# Patient Record
Sex: MALE | Race: WHITE
[De-identification: names, ages, dates, MRNs, and addresses within clinical notes are randomized per-mention and may not be internally consistent; named-entity substitution may affect disease eponyms.]

---

## 2017-02-07 ENCOUNTER — HOSPITAL ENCOUNTER (OUTPATIENT)
Dept: HOSPITAL 53 - M SFHCPLAZ | Age: 52
Discharge: HOME | End: 2017-02-07
Attending: INTERNAL MEDICINE
Payer: MEDICARE

## 2017-02-07 DIAGNOSIS — E83.52: ICD-10-CM

## 2017-02-07 DIAGNOSIS — Z23: ICD-10-CM

## 2017-02-07 DIAGNOSIS — E78.2: ICD-10-CM

## 2017-02-07 DIAGNOSIS — Z12.5: ICD-10-CM

## 2017-02-07 DIAGNOSIS — B20: Primary | ICD-10-CM

## 2017-02-07 LAB
ALBUMIN SERPL BCG-MCNC: 4 GM/DL (ref 3.2–5.2)
ALBUMIN/GLOB SERPL: 1.38 {RATIO} (ref 1–1.93)
ALP SERPL-CCNC: 116 U/L (ref 45–117)
ALT SERPL W P-5'-P-CCNC: 19 U/L (ref 12–78)
ANION GAP SERPL CALC-SCNC: 8 MEQ/L (ref 8–16)
AST SERPL-CCNC: 16 U/L (ref 15–37)
BILIRUB SERPL-MCNC: 0.2 MG/DL (ref 0.2–1)
BUN SERPL-MCNC: 5 MG/DL (ref 7–18)
CALCIUM SERPL-MCNC: 11 MG/DL (ref 8.5–10.1)
CHLORIDE SERPL-SCNC: 105 MEQ/L (ref 98–107)
CHOLEST SERPL-MCNC: 192 MG/DL (ref ?–200)
CO2 SERPL-SCNC: 30 MEQ/L (ref 21–32)
CREAT SERPL-MCNC: 1.09 MG/DL (ref 0.7–1.3)
GFR SERPL CREATININE-BSD FRML MDRD: > 60 ML/MIN/{1.73_M2} (ref 56–?)
GLUCOSE SERPL-MCNC: 102 MG/DL (ref 70–105)
POTASSIUM SERPL-SCNC: 4.5 MEQ/L (ref 3.5–5.1)
PROT SERPL-MCNC: 6.9 GM/DL (ref 6.4–8.2)
SODIUM SERPL-SCNC: 143 MEQ/L (ref 136–145)
TRIGL SERPL-MCNC: 223 MG/DL (ref ?–150)

## 2017-02-07 PROCEDURE — 86360 T CELL ABSOLUTE COUNT/RATIO: CPT

## 2017-02-07 PROCEDURE — 80053 COMPREHEN METABOLIC PANEL: CPT

## 2017-02-07 PROCEDURE — 87536 HIV-1 QUANT&REVRSE TRNSCRPJ: CPT

## 2017-02-07 PROCEDURE — 83970 ASSAY OF PARATHORMONE: CPT

## 2017-02-07 PROCEDURE — 90686 IIV4 VACC NO PRSV 0.5 ML IM: CPT

## 2017-02-07 PROCEDURE — 36415 COLL VENOUS BLD VENIPUNCTURE: CPT

## 2017-02-07 PROCEDURE — 86780 TREPONEMA PALLIDUM: CPT

## 2017-02-07 PROCEDURE — 80061 LIPID PANEL: CPT

## 2017-02-09 LAB
%CD3+CD4+CD8-: 44.3 %
%CD3+CD4-CD8-: 0.9 %
ACANTHOCYTES BLD QL SMEAR: 2.3 %
ACANTHOCYTES BLD QL SMEAR: 25.2 %
CD4/CD8 NYSDOH RATIO: 1.76
HCT VFR BLD AUTO: 42 % (ref 37.5–51)
HGB BLD-MCNC: 14.8 G/DL (ref 12.6–17.7)
MORPHOLOGY BLD-IMP: (no result)
PELGER HUET CELLS BLD QL SMEAR: 29 /UL
PELGER HUET CELLS BLD QL SMEAR: 74 /UL
SMUDGE CELLS BLD QL SMEAR: 1418 /UL
SMUDGE CELLS BLD QL SMEAR: 806 /UL
WBC # BLD AUTO: 10.5 X10E3/UL (ref 3.4–10.8)

## 2017-07-13 ENCOUNTER — HOSPITAL ENCOUNTER (OUTPATIENT)
Dept: HOSPITAL 53 - M SFHCPLAZ | Age: 52
End: 2017-07-13
Attending: INTERNAL MEDICINE
Payer: MEDICARE

## 2017-07-13 DIAGNOSIS — Z11.3: ICD-10-CM

## 2017-07-13 DIAGNOSIS — F17.210: ICD-10-CM

## 2017-07-13 DIAGNOSIS — Z72.53: ICD-10-CM

## 2017-07-13 DIAGNOSIS — Z88.8: ICD-10-CM

## 2017-07-13 DIAGNOSIS — A09: ICD-10-CM

## 2017-07-13 DIAGNOSIS — B20: Primary | ICD-10-CM

## 2017-07-13 LAB
ALBUMIN SERPL BCG-MCNC: 3.9 GM/DL (ref 3.2–5.2)
ALBUMIN/GLOB SERPL: 1.3 {RATIO} (ref 1–1.93)
ALP SERPL-CCNC: 122 U/L (ref 45–117)
ALT SERPL W P-5'-P-CCNC: 17 U/L (ref 12–78)
ANION GAP SERPL CALC-SCNC: 5 MEQ/L (ref 8–16)
AST SERPL-CCNC: 10 U/L (ref 15–37)
BILIRUB SERPL-MCNC: 0.3 MG/DL (ref 0.2–1)
BUN SERPL-MCNC: 6 MG/DL (ref 7–18)
CALCIUM SERPL-MCNC: 10.5 MG/DL (ref 8.5–10.1)
CHLORIDE SERPL-SCNC: 108 MEQ/L (ref 98–107)
CO2 SERPL-SCNC: 27 MEQ/L (ref 21–32)
CREAT SERPL-MCNC: 1.05 MG/DL (ref 0.7–1.3)
GFR SERPL CREATININE-BSD FRML MDRD: > 60 ML/MIN/{1.73_M2} (ref 56–?)
GLUCOSE SERPL-MCNC: 103 MG/DL (ref 70–105)
POTASSIUM SERPL-SCNC: 4.3 MEQ/L (ref 3.5–5.1)
PROT SERPL-MCNC: 6.9 GM/DL (ref 6.4–8.2)
SODIUM SERPL-SCNC: 140 MEQ/L (ref 136–145)

## 2017-07-13 PROCEDURE — 80053 COMPREHEN METABOLIC PANEL: CPT

## 2017-07-13 PROCEDURE — 87491 CHLMYD TRACH DNA AMP PROBE: CPT

## 2017-07-13 PROCEDURE — 93005 ELECTROCARDIOGRAM TRACING: CPT

## 2017-07-13 PROCEDURE — 88108 CYTOPATH CONCENTRATE TECH: CPT

## 2017-07-13 PROCEDURE — 36415 COLL VENOUS BLD VENIPUNCTURE: CPT

## 2017-07-13 PROCEDURE — 86360 T CELL ABSOLUTE COUNT/RATIO: CPT

## 2017-07-13 PROCEDURE — 86780 TREPONEMA PALLIDUM: CPT

## 2017-07-13 PROCEDURE — 87536 HIV-1 QUANT&REVRSE TRNSCRPJ: CPT

## 2017-07-13 PROCEDURE — 87591 N.GONORRHOEAE DNA AMP PROB: CPT

## 2017-07-17 LAB
%CD3+CD4+CD8-: 47.2 %
%CD3+CD4-CD8-: 1.2 %
ACANTHOCYTES BLD QL SMEAR: 2.7 %
ACANTHOCYTES BLD QL SMEAR: 25.9 %
CD4/CD8 NYSDOH RATIO: 1.82
HCT VFR BLD AUTO: 41.9 % (ref 37.5–51)
HGB BLD-MCNC: 14.1 G/DL (ref 12.6–17.7)
MORPHOLOGY BLD-IMP: (no result)
PELGER HUET CELLS BLD QL SMEAR: 37 /UL
PELGER HUET CELLS BLD QL SMEAR: 84 /UL
SMUDGE CELLS BLD QL SMEAR: 1463 /UL
SMUDGE CELLS BLD QL SMEAR: 803 /UL
WBC # BLD AUTO: 8.7 X10E3/UL (ref 3.4–10.8)

## 2018-01-02 ENCOUNTER — HOSPITAL ENCOUNTER (OUTPATIENT)
Dept: HOSPITAL 53 - M SFHCPLAZ | Age: 53
End: 2018-01-02
Attending: INTERNAL MEDICINE
Payer: MEDICARE

## 2018-01-02 DIAGNOSIS — B20: Primary | ICD-10-CM

## 2018-01-02 DIAGNOSIS — Z23: ICD-10-CM

## 2018-01-02 DIAGNOSIS — I10: ICD-10-CM

## 2018-01-02 DIAGNOSIS — E78.2: ICD-10-CM

## 2018-01-02 LAB
ALBUMIN/GLOBULIN RATIO: 1.39 (ref 1–1.93)
ALBUMIN: 4.3 GM/DL (ref 3.2–5.2)
ALKALINE PHOSPHATASE: 131 U/L (ref 45–117)
ALT SERPL W P-5'-P-CCNC: 24 U/L (ref 12–78)
ANION GAP: 7 MEQ/L (ref 8–16)
APPEARANCE, URINE: CLEAR
AST SERPL-CCNC: 19 U/L (ref 7–37)
BACTERIA UR QL AUTO: NEGATIVE
BILIRUBIN, URINE AUTO: NEGATIVE
BILIRUBIN,TOTAL: 0.6 MG/DL (ref 0.2–1)
BLOOD UREA NITROGEN: 8 MG/DL (ref 7–18)
BLOOD, URINE BLOOD: NEGATIVE
CALCIUM LEVEL: 9.9 MG/DL (ref 8.5–10.1)
CARBON DIOXIDE LEVEL: 26 MEQ/L (ref 21–32)
CHLAMYDIA DNA AMPLIFICATION: NEGATIVE
CHLORIDE LEVEL: 105 MEQ/L (ref 98–107)
CHOLEST SERPL-MCNC: 199 MG/DL (ref ?–200)
CHOLESTEROL RISK RATIO: 7.37 (ref ?–5)
CREATININE FOR GFR: 1.08 MG/DL (ref 0.7–1.3)
GC DNA AMPLIFICATION: NEGATIVE
GFR SERPL CREATININE-BSD FRML MDRD: > 60 ML/MIN/{1.73_M2} (ref 56–?)
GLUCOSE, FASTING: 117 MG/DL (ref 70–105)
GLUCOSE, URINE (UA) AUTO: NEGATIVE MG/DL
HDLC SERPL-MCNC: 27 MG/DL (ref 40–?)
KETONE, URINE AUTO: NEGATIVE MG/DL
LDL CHOLESTEROL: 116.6 MG/DL (ref ?–100)
LEUKOCYTE ESTERASE UR QL STRIP.AUTO: NEGATIVE
NITRITE, URINE AUTO: NEGATIVE
NONHDLC SERPL-MCNC: 172 MG/DL
PH,URINE: 6 UNITS (ref 5–9)
POTASSIUM SERUM: 4.4 MEQ/L (ref 3.5–5.1)
PROT UR QL STRIP.AUTO: NEGATIVE MG/DL
RBC, URINE AUTO: 5 /HPF (ref 0–3)
SODIUM LEVEL: 138 MEQ/L (ref 136–145)
SPECIFIC GRAVITY URINE AUTO: 1.01 (ref 1–1.03)
SQUAMOUS #/AREA URNS AUTO: 0 /HPF (ref 0–6)
THYROID STIMULATING HORMONE: 0.76 UIU/ML (ref 0.36–3.74)
TOTAL PROTEIN: 7.4 GM/DL (ref 6.4–8.2)
TRIGLYCERIDES LEVEL: 277 MG/DL (ref ?–150)
UROBILINOGEN, URINE AUTO: 0.2 MG/DL (ref 0–2)
WBC, URINE AUTO: 1 /HPF (ref 0–3)

## 2018-01-02 PROCEDURE — 84443 ASSAY THYROID STIM HORMONE: CPT

## 2018-01-06 LAB
BASOPHILS # BLD: 0.1 X10E3/UL (ref 0–0.2)
CD3+CD4+ CELLS # BLD: 1500 /UL (ref 359–1519)
CD3+CD4+ CELLS NFR BLD: 48.4 % (ref 30.8–58.5)
CD3+CD4+ CELLS/CD3+CD8+ CLL BLD: 1.64 % (ref 0.92–3.72)
CD3+CD8+ CELLS # BLD: 918 /UL (ref 109–897)
CD3+CD8+ CELLS NFR BLD: 29.6 % (ref 12–35.5)
EOSINOPHIL # BLD: 0.3 X10E3/UL (ref 0–0.4)
ERYTHROCYTE [DISTWIDTH] IN BLOOD BY AUTOMATED COUNT: 14.4 % (ref 12.3–15.4)
HCT VFR BLD AUTO: 44.5 % (ref 37.5–51)
HGB BLD-MCNC: 14.6 G/DL (ref 13–17.7)
HIV1 RNA # SERPL NAA+PROBE: <20 COPIES/ML
HIV1 RNA SERPL NAA+PROBE-LOG#: (no result) {LOG_COPIES}/ML
LYMPHOCYTES # BLD AUTO: 3.1 X10E3/UL (ref 0.7–3.1)
MCH RBC QN AUTO: 30.9 PG (ref 26.6–33)
MCHC RBC AUTO-ENTMCNC: 32.8 G/DL (ref 31.5–35.7)
MCV RBC: 94 FL (ref 79–97)
MONOCYTES # BLD: 0.8 X10E3/UL (ref 0.1–0.9)
MORPHOLOGY BLD-IMP: (no result)
NEUTROPHILS # BLD AUTO: 7.3 X10E3/UL (ref 1.4–7)
NRBC: (no result)
RBC # BLD AUTO: 4.72 X10E6/UL (ref 4.14–5.8)
TB TEST (QFT) ANTIGEN MINUS NI: <0.01 IU/ML
TB TEST (QFT) ANTIGEN: 0.03 IU/ML
TB TEST (QFT) MITOGEN: 9.52 IU/ML
TB TEST (QFT) NIL: 0.05 IU/ML
WBC # BLD AUTO: 11.6 X10E3/UL (ref 3.4–10.8)

## 2018-04-02 ENCOUNTER — HOSPITAL ENCOUNTER (OUTPATIENT)
Dept: HOSPITAL 53 - M SFHCLERA | Age: 53
End: 2018-04-02
Attending: DERMATOLOGY
Payer: MEDICARE

## 2018-04-02 DIAGNOSIS — L72.0: Primary | ICD-10-CM

## 2018-04-02 PROCEDURE — 88305 TISSUE EXAM BY PATHOLOGIST: CPT

## 2018-08-13 ENCOUNTER — HOSPITAL ENCOUNTER (OUTPATIENT)
Dept: HOSPITAL 53 - M SMT | Age: 53
End: 2018-08-13
Attending: NURSE PRACTITIONER
Payer: MEDICARE

## 2018-08-13 ENCOUNTER — HOSPITAL ENCOUNTER (OUTPATIENT)
Dept: HOSPITAL 53 - M SFHCPLAZ | Age: 53
End: 2018-08-13
Attending: INTERNAL MEDICINE
Payer: MEDICARE

## 2018-08-13 DIAGNOSIS — N50.819: Primary | ICD-10-CM

## 2018-08-13 DIAGNOSIS — B20: Primary | ICD-10-CM

## 2018-08-13 DIAGNOSIS — B20: ICD-10-CM

## 2018-08-13 LAB
ALBUMIN/GLOBULIN RATIO: 1.06 (ref 1–1.93)
ALBUMIN: 3.6 GM/DL (ref 3.2–5.2)
ALKALINE PHOSPHATASE: 140 U/L (ref 45–117)
ALT SERPL W P-5'-P-CCNC: 21 U/L (ref 12–78)
ANION GAP: 12 MEQ/L (ref 8–16)
APPEARANCE, URINE: CLEAR
AST SERPL-CCNC: 17 U/L (ref 7–37)
BACTERIA UR QL AUTO: NEGATIVE
BILIRUBIN, URINE AUTO: NEGATIVE
BILIRUBIN,TOTAL: 0.3 MG/DL (ref 0.2–1)
BLOOD UREA NITROGEN: 6 MG/DL (ref 7–18)
BLOOD, URINE BLOOD: NEGATIVE
CALCIUM LEVEL: 10.2 MG/DL (ref 8.5–10.1)
CARBON DIOXIDE LEVEL: 21 MEQ/L (ref 21–32)
CHLAMYDIA DNA AMPLIFICATION: NEGATIVE
CHLORIDE LEVEL: 110 MEQ/L (ref 98–107)
CREATININE FOR GFR: 1.18 MG/DL (ref 0.7–1.3)
GC DNA AMPLIFICATION: NEGATIVE
GFR SERPL CREATININE-BSD FRML MDRD: > 60 ML/MIN/{1.73_M2} (ref 56–?)
GLUCOSE, FASTING: 140 MG/DL (ref 70–100)
GLUCOSE, URINE (UA) AUTO: NEGATIVE MG/DL
KETONE, URINE AUTO: NEGATIVE MG/DL
LEUKOCYTE ESTERASE UR QL STRIP.AUTO: NEGATIVE
NITRITE, URINE AUTO: NEGATIVE
PH,URINE: 8 UNITS (ref 5–9)
POTASSIUM SERUM: 4.3 MEQ/L (ref 3.5–5.1)
PROT UR QL STRIP.AUTO: NEGATIVE MG/DL
RBC, URINE AUTO: 0 /HPF (ref 0–3)
SODIUM LEVEL: 143 MEQ/L (ref 136–145)
SPECIFIC GRAVITY URINE AUTO: 1 (ref 1–1.03)
SQUAMOUS #/AREA URNS AUTO: 0 /HPF (ref 0–6)
TOTAL PROTEIN: 7 GM/DL (ref 6.4–8.2)
UROBILINOGEN, URINE AUTO: 0.2 MG/DL (ref 0–2)
WBC, URINE AUTO: 0 /HPF (ref 0–3)

## 2018-08-13 PROCEDURE — 81001 URINALYSIS AUTO W/SCOPE: CPT

## 2018-08-13 PROCEDURE — 80053 COMPREHEN METABOLIC PANEL: CPT

## 2018-10-08 ENCOUNTER — HOSPITAL ENCOUNTER (OUTPATIENT)
Dept: HOSPITAL 53 - M RAD | Age: 53
End: 2018-10-08
Attending: UROLOGY
Payer: MEDICARE

## 2018-10-08 DIAGNOSIS — N50.89: ICD-10-CM

## 2018-10-08 DIAGNOSIS — N50.3: Primary | ICD-10-CM

## 2018-10-08 PROCEDURE — 76870 US EXAM SCROTUM: CPT

## 2018-11-06 ENCOUNTER — HOSPITAL ENCOUNTER (OUTPATIENT)
Dept: HOSPITAL 53 - M LABSMT | Age: 53
End: 2018-11-06
Attending: UROLOGY
Payer: MEDICARE

## 2018-11-06 DIAGNOSIS — Z23: ICD-10-CM

## 2018-11-06 DIAGNOSIS — N50.3: ICD-10-CM

## 2018-11-06 DIAGNOSIS — Z01.818: Primary | ICD-10-CM

## 2018-11-06 DIAGNOSIS — B20: ICD-10-CM

## 2018-11-06 LAB
ANION GAP: 9 MEQ/L (ref 8–16)
BLOOD UREA NITROGEN: 6 MG/DL (ref 7–18)
CALCIUM LEVEL: 10.1 MG/DL (ref 8.5–10.1)
CARBON DIOXIDE LEVEL: 21 MEQ/L (ref 21–32)
CHLORIDE LEVEL: 109 MEQ/L (ref 98–107)
CREATININE FOR GFR: 1.16 MG/DL (ref 0.7–1.3)
GFR SERPL CREATININE-BSD FRML MDRD: > 60 ML/MIN/{1.73_M2} (ref 56–?)
GLUCOSE, FASTING: 109 MG/DL (ref 70–100)
HEMATOCRIT: 45.2 % (ref 42–52)
HEMOGLOBIN: 15.3 G/DL (ref 13.5–17.5)
INR: 1.04
MEAN CORPUSCULAR HEMOGLOBIN: 31.2 PG (ref 27–33)
MEAN CORPUSCULAR HGB CONC: 33.8 G/DL (ref 32–36.5)
MEAN CORPUSCULAR VOLUME: 92.2 FL (ref 80–96)
NRBC BLD AUTO-RTO: 0 % (ref 0–0)
PARTIAL THROMBOPLASTIN TIME: 31.6 SECONDS (ref 25.4–37.6)
PLATELET COUNT, AUTOMATED: 245 10^3/UL (ref 150–450)
POTASSIUM SERUM: 4.5 MEQ/L (ref 3.5–5.1)
PROTHROMBIN TIME: 13.7 SECONDS (ref 12.1–14.4)
RED BLOOD COUNT: 4.9 10^6/UL (ref 4.3–6.1)
RED CELL DISTRIBUTION WIDTH: 13.1 % (ref 11.5–14.5)
SODIUM LEVEL: 139 MEQ/L (ref 136–145)
WHITE BLOOD COUNT: 10.1 10^3/UL (ref 4–10)

## 2018-11-06 PROCEDURE — 80048 BASIC METABOLIC PNL TOTAL CA: CPT

## 2018-11-20 ENCOUNTER — HOSPITAL ENCOUNTER (OUTPATIENT)
Dept: HOSPITAL 53 - M SDC | Age: 53
Discharge: HOME | End: 2018-11-20
Attending: UROLOGY
Payer: MEDICARE

## 2018-11-20 DIAGNOSIS — E78.00: ICD-10-CM

## 2018-11-20 DIAGNOSIS — J84.112: ICD-10-CM

## 2018-11-20 DIAGNOSIS — Z79.899: ICD-10-CM

## 2018-11-20 DIAGNOSIS — M50.10: ICD-10-CM

## 2018-11-20 DIAGNOSIS — M12.9: ICD-10-CM

## 2018-11-20 DIAGNOSIS — G47.33: ICD-10-CM

## 2018-11-20 DIAGNOSIS — F43.10: ICD-10-CM

## 2018-11-20 DIAGNOSIS — L40.9: ICD-10-CM

## 2018-11-20 DIAGNOSIS — K21.9: ICD-10-CM

## 2018-11-20 DIAGNOSIS — J44.9: ICD-10-CM

## 2018-11-20 DIAGNOSIS — F32.9: ICD-10-CM

## 2018-11-20 DIAGNOSIS — K59.01: ICD-10-CM

## 2018-11-20 DIAGNOSIS — N50.89: ICD-10-CM

## 2018-11-20 DIAGNOSIS — B20: ICD-10-CM

## 2018-11-20 DIAGNOSIS — M79.7: ICD-10-CM

## 2018-11-20 DIAGNOSIS — Z72.0: ICD-10-CM

## 2018-11-20 DIAGNOSIS — F41.9: ICD-10-CM

## 2018-11-20 DIAGNOSIS — E83.52: ICD-10-CM

## 2018-11-20 DIAGNOSIS — N50.3: Primary | ICD-10-CM

## 2018-11-20 DIAGNOSIS — I10: ICD-10-CM

## 2018-11-20 DIAGNOSIS — G47.00: ICD-10-CM

## 2018-11-20 DIAGNOSIS — M81.0: ICD-10-CM

## 2018-11-20 DIAGNOSIS — Z85.828: ICD-10-CM

## 2018-11-20 PROCEDURE — 54860 REMOVAL OF EPIDIDYMIS: CPT

## 2018-11-20 RX ADMIN — BUPIVACAINE HYDROCHLORIDE 1 ML: 2.5 INJECTION, SOLUTION EPIDURAL; INFILTRATION; INTRACAUDAL at 16:23

## 2018-11-20 RX ADMIN — LIDOCAINE HYDROCHLORIDE 1 ML: 10 INJECTION, SOLUTION EPIDURAL; INFILTRATION; INTRACAUDAL; PERINEURAL at 16:23

## 2018-11-20 RX ADMIN — BACITRACIN 1 DOSE: 500 OINTMENT TOPICAL at 17:19

## 2018-11-20 RX ADMIN — SODIUM CHLORIDE, POTASSIUM CHLORIDE, SODIUM LACTATE AND CALCIUM CHLORIDE 1 MLS/HR: 600; 310; 30; 20 INJECTION, SOLUTION INTRAVENOUS at 14:07

## 2019-03-05 ENCOUNTER — HOSPITAL ENCOUNTER (OUTPATIENT)
Dept: HOSPITAL 53 - M SFHCPLAZ | Age: 54
End: 2019-03-05
Attending: INTERNAL MEDICINE
Payer: MEDICARE

## 2019-03-05 DIAGNOSIS — M81.0: ICD-10-CM

## 2019-03-05 DIAGNOSIS — I10: ICD-10-CM

## 2019-03-05 DIAGNOSIS — B20: Primary | ICD-10-CM

## 2019-03-05 LAB
25(OH)D3 SERPL-MCNC: 16.5 NG/ML (ref 30–100)
ALBUMIN SERPL BCG-MCNC: 3.9 GM/DL (ref 3.2–5.2)
ALT SERPL W P-5'-P-CCNC: 25 U/L (ref 12–78)
APPEARANCE UR: (no result)
BACTERIA UR QL AUTO: (no result)
BILIRUB SERPL-MCNC: 0.4 MG/DL (ref 0.2–1)
BILIRUB UR QL STRIP.AUTO: NEGATIVE
BUN SERPL-MCNC: 7 MG/DL (ref 7–18)
CALCIUM SERPL-MCNC: 9.8 MG/DL (ref 8.5–10.1)
CHLORIDE SERPL-SCNC: 109 MEQ/L (ref 98–107)
CHOLEST SERPL-MCNC: 164 MG/DL (ref ?–200)
CHOLEST/HDLC SERPL: 6.83 {RATIO} (ref ?–5)
CO2 SERPL-SCNC: 28 MEQ/L (ref 21–32)
CREAT SERPL-MCNC: 1.1 MG/DL (ref 0.7–1.3)
GFR SERPL CREATININE-BSD FRML MDRD: > 60 ML/MIN/{1.73_M2} (ref 56–?)
GLUCOSE SERPL-MCNC: 93 MG/DL (ref 70–100)
GLUCOSE UR QL STRIP.AUTO: NEGATIVE MG/DL
HDLC SERPL-MCNC: 24 MG/DL (ref 40–?)
HGB UR QL STRIP.AUTO: NEGATIVE
KETONES UR QL STRIP.AUTO: NEGATIVE MG/DL
LDLC SERPL CALC-MCNC: 72 MG/DL (ref ?–100)
LEUKOCYTE ESTERASE UR QL STRIP.AUTO: NEGATIVE
NITRITE UR QL STRIP.AUTO: NEGATIVE
NONHDLC SERPL-MCNC: 140 MG/DL
PH UR STRIP.AUTO: 7 UNITS (ref 5–9)
POTASSIUM SERPL-SCNC: 4.4 MEQ/L (ref 3.5–5.1)
PROT SERPL-MCNC: 7 GM/DL (ref 6.4–8.2)
PROT UR QL STRIP.AUTO: NEGATIVE MG/DL
RBC # UR AUTO: 2 /HPF (ref 0–3)
SODIUM SERPL-SCNC: 142 MEQ/L (ref 136–145)
SP GR UR STRIP.AUTO: 1 (ref 1–1.03)
SQUAMOUS #/AREA URNS AUTO: 0 /HPF (ref 0–6)
TRIGL SERPL-MCNC: 338 MG/DL (ref ?–150)
UROBILINOGEN UR QL STRIP.AUTO: 0.2 MG/DL (ref 0–2)
WBC #/AREA URNS AUTO: 0 /HPF (ref 0–3)

## 2019-03-05 PROCEDURE — 86360 T CELL ABSOLUTE COUNT/RATIO: CPT

## 2019-03-05 PROCEDURE — 82306 VITAMIN D 25 HYDROXY: CPT

## 2019-03-05 PROCEDURE — 36415 COLL VENOUS BLD VENIPUNCTURE: CPT

## 2019-03-05 PROCEDURE — 80061 LIPID PANEL: CPT

## 2019-03-05 PROCEDURE — 86480 TB TEST CELL IMMUN MEASURE: CPT

## 2019-03-05 PROCEDURE — 87536 HIV-1 QUANT&REVRSE TRNSCRPJ: CPT

## 2019-03-05 PROCEDURE — 81001 URINALYSIS AUTO W/SCOPE: CPT

## 2019-03-05 PROCEDURE — 80053 COMPREHEN METABOLIC PANEL: CPT

## 2019-03-09 LAB
BASOPHILS # BLD: 0.1 X10E3/UL (ref 0–0.2)
CD3+CD4+ CELLS # BLD: 1697 /UL (ref 359–1519)
CD3+CD4+ CELLS NFR BLD: 49.9 % (ref 30.8–58.5)
CD3+CD4+ CELLS/CD3+CD8+ CLL BLD: 1.84 % (ref 0.92–3.72)
CD3+CD8+ CELLS # BLD: 921 /UL (ref 109–897)
CD3+CD8+ CELLS NFR BLD: 27.1 % (ref 12–35.5)
EOSINOPHIL # BLD: 0.3 X10E3/UL (ref 0–0.4)
ERYTHROCYTE [DISTWIDTH] IN BLOOD BY AUTOMATED COUNT: 14.3 % (ref 12.3–15.4)
HCT VFR BLD AUTO: 44.2 % (ref 37.5–51)
HGB BLD-MCNC: 15.1 G/DL (ref 13–17.7)
HIV1 RNA # SERPL NAA+PROBE: <20 COPIES/ML
HIV1 RNA SERPL NAA+PROBE-LOG#: (no result) {LOG_COPIES}/ML
LYMPHOCYTES # BLD AUTO: 3.4 X10E3/UL (ref 0.7–3.1)
MCH RBC QN AUTO: 31.5 PG (ref 26.6–33)
MCHC RBC AUTO-ENTMCNC: 34.2 G/DL (ref 31.5–35.7)
MCV RBC: 92 FL (ref 79–97)
MONOCYTES # BLD: 0.9 X10E3/UL (ref 0.1–0.9)
NEUTROPHILS # BLD AUTO: 3.8 X10E3/UL (ref 1.4–7)
NRBC BLD AUTO-RTO: (no result) %
RBC # BLD AUTO: 4.79 X10E6/UL (ref 4.14–5.8)
WBC # BLD AUTO: 8.6 X10E3/UL (ref 3.4–10.8)

## 2019-11-18 ENCOUNTER — HOSPITAL ENCOUNTER (OUTPATIENT)
Dept: HOSPITAL 53 - M SFHCPLAZ | Age: 54
End: 2019-11-18
Attending: INTERNAL MEDICINE
Payer: MEDICARE

## 2019-11-18 DIAGNOSIS — B20: Primary | ICD-10-CM

## 2019-11-18 DIAGNOSIS — L40.9: ICD-10-CM

## 2019-11-18 DIAGNOSIS — E55.9: ICD-10-CM

## 2019-11-18 LAB
25(OH)D3 SERPL-MCNC: 46.8 NG/ML (ref 30–100)
ALBUMIN SERPL BCG-MCNC: 3.6 GM/DL (ref 3.2–5.2)
ALT SERPL W P-5'-P-CCNC: 26 U/L (ref 12–78)
BILIRUB SERPL-MCNC: 0.5 MG/DL (ref 0.2–1)
BUN SERPL-MCNC: 7 MG/DL (ref 7–18)
CALCIUM SERPL-MCNC: 9.7 MG/DL (ref 8.5–10.1)
CHLAMYDIA DNA AMPLIFICATION: NEGATIVE
CHLORIDE SERPL-SCNC: 106 MEQ/L (ref 98–107)
CHOLEST SERPL-MCNC: 133 MG/DL (ref ?–200)
CHOLEST/HDLC SERPL: 5.54 {RATIO} (ref ?–5)
CO2 SERPL-SCNC: 27 MEQ/L (ref 21–32)
CREAT SERPL-MCNC: 1.2 MG/DL (ref 0.7–1.3)
GFR SERPL CREATININE-BSD FRML MDRD: > 60 ML/MIN/{1.73_M2} (ref 56–?)
GLUCOSE SERPL-MCNC: 110 MG/DL (ref 70–100)
HDLC SERPL-MCNC: 24 MG/DL (ref 40–?)
LDLC SERPL CALC-MCNC: 66 MG/DL (ref ?–100)
N GONORRHOEA RRNA SPEC QL NAA+PROBE: NEGATIVE
NONHDLC SERPL-MCNC: 109 MG/DL
POTASSIUM SERPL-SCNC: 4.4 MEQ/L (ref 3.5–5.1)
PROT SERPL-MCNC: 6.6 GM/DL (ref 6.4–8.2)
SODIUM SERPL-SCNC: 138 MEQ/L (ref 136–145)
TRIGL SERPL-MCNC: 213 MG/DL (ref ?–150)

## 2019-11-18 PROCEDURE — 86780 TREPONEMA PALLIDUM: CPT

## 2019-11-18 PROCEDURE — 87491 CHLMYD TRACH DNA AMP PROBE: CPT

## 2019-11-18 PROCEDURE — 80053 COMPREHEN METABOLIC PANEL: CPT

## 2019-11-18 PROCEDURE — 82306 VITAMIN D 25 HYDROXY: CPT

## 2019-11-18 PROCEDURE — 90682 RIV4 VACC RECOMBINANT DNA IM: CPT

## 2019-11-18 PROCEDURE — 87591 N.GONORRHOEAE DNA AMP PROB: CPT

## 2019-11-18 PROCEDURE — 80061 LIPID PANEL: CPT

## 2019-11-18 PROCEDURE — 86360 T CELL ABSOLUTE COUNT/RATIO: CPT

## 2019-11-18 PROCEDURE — 90732 PPSV23 VACC 2 YRS+ SUBQ/IM: CPT

## 2019-11-18 PROCEDURE — 36415 COLL VENOUS BLD VENIPUNCTURE: CPT

## 2019-11-18 PROCEDURE — 87536 HIV-1 QUANT&REVRSE TRNSCRPJ: CPT

## 2019-11-22 LAB
BASOPHILS # BLD: 0.1 X10E3/UL (ref 0–0.2)
CD3+CD4+ CELLS # BLD: 1469 /UL (ref 359–1519)
CD3+CD4+ CELLS NFR BLD: 47.4 % (ref 30.8–58.5)
CD3+CD4+ CELLS/CD3+CD8+ CLL BLD: 1.47 % (ref 0.92–3.72)
CD3+CD8+ CELLS # BLD: 998 /UL (ref 109–897)
CD3+CD8+ CELLS NFR BLD: 32.2 % (ref 12–35.5)
EOSINOPHIL # BLD: 0.3 X10E3/UL (ref 0–0.4)
ERYTHROCYTE [DISTWIDTH] IN BLOOD BY AUTOMATED COUNT: 14 % (ref 12.3–15.4)
HCT VFR BLD AUTO: 43.3 % (ref 37.5–51)
HGB BLD-MCNC: 14.4 G/DL (ref 13–17.7)
HIV1 RNA # SERPL NAA+PROBE: <20 COPIES/ML
HIV1 RNA SERPL NAA+PROBE-LOG#: (no result) {LOG_COPIES}/ML
LYMPHOCYTES # BLD AUTO: 3.1 X10E3/UL (ref 0.7–3.1)
MCH RBC QN AUTO: 30.6 PG (ref 26.6–33)
MCHC RBC AUTO-ENTMCNC: 33.3 G/DL (ref 31.5–35.7)
MCV RBC: 92 FL (ref 79–97)
MONOCYTES # BLD: 0.9 X10E3/UL (ref 0.1–0.9)
NEUTROPHILS # BLD AUTO: 7.1 X10E3/UL (ref 1.4–7)
NRBC BLD AUTO-RTO: (no result) %
RBC # BLD AUTO: 4.7 X10E6/UL (ref 4.14–5.8)
WBC # BLD AUTO: 11.4 X10E3/UL (ref 3.4–10.8)

## 2020-08-11 ENCOUNTER — HOSPITAL ENCOUNTER (OUTPATIENT)
Dept: HOSPITAL 53 - M SFHCPLAZ | Age: 55
Discharge: HOME | End: 2020-08-11
Attending: INTERNAL MEDICINE
Payer: COMMERCIAL

## 2020-08-11 DIAGNOSIS — B20: Primary | ICD-10-CM

## 2020-08-11 DIAGNOSIS — Z79.899: ICD-10-CM

## 2020-09-21 LAB — HIV1 RNA # SERPL NAA+PROBE: (no result) {COPIES}/ML

## 2020-09-25 LAB
CALCIUM SERPL-MCNC: 10.2 MG/DL (ref 8.5–10.1)
CHOLEST SERPL-MCNC: 155 MG/DL (ref ?–200)
CHOLEST/HDLC SERPL: 6.2 {RATIO} (ref ?–5)
HDLC SERPL-MCNC: 25 MG/DL (ref 40–?)
LDLC SERPL CALC-MCNC: 83 MG/DL (ref ?–100)
MAGNESIUM SERPL-MCNC: 2 MG/DL (ref 1.8–2.4)
NONHDLC SERPL-MCNC: 130 MG/DL
PHOSPHATE SERPL-MCNC: 2.9 MG/DL (ref 2.5–4.9)
TRIGL SERPL-MCNC: 235 MG/DL (ref ?–150)

## 2021-04-26 ENCOUNTER — HOSPITAL ENCOUNTER (OUTPATIENT)
Dept: HOSPITAL 53 - M LAB | Age: 56
End: 2021-04-26
Attending: INTERNAL MEDICINE
Payer: MEDICARE

## 2021-04-26 ENCOUNTER — HOSPITAL ENCOUNTER (OUTPATIENT)
Dept: HOSPITAL 53 - M PAIN | Age: 56
End: 2021-04-26
Attending: NURSE PRACTITIONER
Payer: MEDICARE

## 2021-04-26 DIAGNOSIS — J42: ICD-10-CM

## 2021-04-26 DIAGNOSIS — G89.29: ICD-10-CM

## 2021-04-26 DIAGNOSIS — M50.10: ICD-10-CM

## 2021-04-26 DIAGNOSIS — G25.81: ICD-10-CM

## 2021-04-26 DIAGNOSIS — Z79.899: ICD-10-CM

## 2021-04-26 DIAGNOSIS — F17.210: ICD-10-CM

## 2021-04-26 DIAGNOSIS — M79.7: ICD-10-CM

## 2021-04-26 DIAGNOSIS — M81.0: Primary | ICD-10-CM

## 2021-04-26 DIAGNOSIS — E78.00: ICD-10-CM

## 2021-04-26 DIAGNOSIS — M54.41: ICD-10-CM

## 2021-04-26 DIAGNOSIS — R25.1: ICD-10-CM

## 2021-04-26 DIAGNOSIS — M51.17: Primary | ICD-10-CM

## 2021-04-26 DIAGNOSIS — Z88.8: ICD-10-CM

## 2021-04-26 DIAGNOSIS — K21.9: ICD-10-CM

## 2021-04-26 DIAGNOSIS — I10: ICD-10-CM

## 2021-04-26 DIAGNOSIS — G47.00: ICD-10-CM

## 2021-04-26 DIAGNOSIS — F41.1: ICD-10-CM

## 2021-04-26 DIAGNOSIS — B20: ICD-10-CM

## 2021-04-26 DIAGNOSIS — F43.10: ICD-10-CM

## 2021-04-26 DIAGNOSIS — Z79.891: ICD-10-CM

## 2021-04-26 DIAGNOSIS — M81.0: ICD-10-CM

## 2021-04-26 DIAGNOSIS — F33.9: ICD-10-CM

## 2021-04-26 DIAGNOSIS — Z88.6: ICD-10-CM

## 2021-04-26 DIAGNOSIS — L40.9: ICD-10-CM

## 2021-04-26 LAB
25(OH)D3 SERPL-MCNC: 49.6 NG/ML (ref 30–100)
ALBUMIN SERPL BCG-MCNC: 3.9 GM/DL (ref 3.2–5.2)
ALT SERPL W P-5'-P-CCNC: 22 U/L (ref 12–78)
APPEARANCE UR: CLEAR
BACTERIA UR QL AUTO: NEGATIVE
BILIRUB SERPL-MCNC: 0.3 MG/DL (ref 0.2–1)
BILIRUB UR QL STRIP.AUTO: NEGATIVE
BUN SERPL-MCNC: 10 MG/DL (ref 7–18)
CALCIUM SERPL-MCNC: 11.1 MG/DL (ref 8.5–10.1)
CHLORIDE SERPL-SCNC: 109 MEQ/L (ref 98–107)
CO2 SERPL-SCNC: 27 MEQ/L (ref 21–32)
CREAT SERPL-MCNC: 1.07 MG/DL (ref 0.7–1.3)
GFR SERPL CREATININE-BSD FRML MDRD: > 60 ML/MIN/{1.73_M2} (ref 56–?)
GLUCOSE SERPL-MCNC: 104 MG/DL (ref 70–100)
GLUCOSE UR QL STRIP.AUTO: NEGATIVE MG/DL
HGB UR QL STRIP.AUTO: NEGATIVE
KETONES UR QL STRIP.AUTO: NEGATIVE MG/DL
LEUKOCYTE ESTERASE UR QL STRIP.AUTO: NEGATIVE
MAGNESIUM SERPL-MCNC: 2.4 MG/DL (ref 1.8–2.4)
MUCOUS THREADS URNS QL MICRO: (no result)
NITRITE UR QL STRIP.AUTO: NEGATIVE
PH UR STRIP.AUTO: 6 UNITS (ref 5–9)
POTASSIUM SERPL-SCNC: 4.4 MEQ/L (ref 3.5–5.1)
PROT SERPL-MCNC: 7.2 GM/DL (ref 6.4–8.2)
PROT UR QL STRIP.AUTO: NEGATIVE MG/DL
RBC # UR AUTO: 1 /HPF (ref 0–3)
SODIUM SERPL-SCNC: 139 MEQ/L (ref 136–145)
SP GR UR STRIP.AUTO: 1.01 (ref 1–1.03)
SQUAMOUS #/AREA URNS AUTO: 0 /HPF (ref 0–6)
T4 FREE SERPL-MCNC: 0.75 NG/DL (ref 0.76–1.46)
TSH SERPL DL<=0.005 MIU/L-ACNC: 2.14 UIU/ML (ref 0.36–3.74)
UROBILINOGEN UR QL STRIP.AUTO: 0.2 MG/DL (ref 0–2)
WBC #/AREA URNS AUTO: 2 /HPF (ref 0–3)

## 2021-04-26 PROCEDURE — 86360 T CELL ABSOLUTE COUNT/RATIO: CPT

## 2021-04-26 PROCEDURE — 87086 URINE CULTURE/COLONY COUNT: CPT

## 2021-04-26 PROCEDURE — 87536 HIV-1 QUANT&REVRSE TRNSCRPJ: CPT

## 2021-04-26 PROCEDURE — 36415 COLL VENOUS BLD VENIPUNCTURE: CPT

## 2021-04-26 PROCEDURE — 83735 ASSAY OF MAGNESIUM: CPT

## 2021-04-26 PROCEDURE — 81001 URINALYSIS AUTO W/SCOPE: CPT

## 2021-04-26 PROCEDURE — 84439 ASSAY OF FREE THYROXINE: CPT

## 2021-04-26 PROCEDURE — 80053 COMPREHEN METABOLIC PANEL: CPT

## 2021-04-26 PROCEDURE — 84443 ASSAY THYROID STIM HORMONE: CPT

## 2021-04-26 PROCEDURE — 82306 VITAMIN D 25 HYDROXY: CPT
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
## 2021-04-29 LAB
BASOPHILS # BLD: 0.1 X10E3/UL (ref 0–0.2)
CD3+CD4+ CELLS # BLD: 1233 /UL (ref 359–1519)
CD3+CD4+ CELLS NFR BLD: 58.7 % (ref 30.8–58.5)
CD3+CD4+ CELLS/CD3+CD8+ CLL BLD: 2.05 % (ref 0.92–3.72)
CD3+CD8+ CELLS # BLD: 603 /UL (ref 109–897)
CD3+CD8+ CELLS NFR BLD: 28.7 % (ref 12–35.5)
EOSINOPHIL # BLD: 0.1 X10E3/UL (ref 0–0.4)
ERYTHROCYTE [DISTWIDTH] IN BLOOD BY AUTOMATED COUNT: 12 % (ref 11.6–15.4)
HCT VFR BLD AUTO: 43.9 % (ref 37.5–51)
HGB BLD-MCNC: 14.9 G/DL (ref 13–17.7)
HIV1 RNA # SERPL NAA+PROBE: (no result) COPIES/ML
HIV1 RNA SERPL NAA+PROBE-LOG#: 4.24 {LOG_COPIES}/ML
LYMPHOCYTES # BLD AUTO: 2.1 X10E3/UL (ref 0.7–3.1)
MCH RBC QN AUTO: 31.5 PG (ref 26.6–33)
MCHC RBC AUTO-ENTMCNC: 33.9 G/DL (ref 31.5–35.7)
MCV RBC: 93 FL (ref 79–97)
MONOCYTES # BLD: 0.6 X10E3/UL (ref 0.1–0.9)
NEUTROPHILS # BLD AUTO: 4 X10E3/UL (ref 1.4–7)
NRBC BLD AUTO-RTO: (no result) %
RBC # BLD AUTO: 4.73 X10E6/UL (ref 4.14–5.8)
WBC # BLD AUTO: 7 X10E3/UL (ref 3.4–10.8)

## 2021-06-24 ENCOUNTER — HOSPITAL ENCOUNTER (OUTPATIENT)
Dept: HOSPITAL 53 - M SFHCPLAZ | Age: 56
End: 2021-06-24
Attending: INTERNAL MEDICINE
Payer: MEDICARE

## 2021-06-24 DIAGNOSIS — B20: ICD-10-CM

## 2021-06-24 DIAGNOSIS — E83.52: Primary | ICD-10-CM

## 2021-06-24 LAB
BUN SERPL-MCNC: 7 MG/DL (ref 7–18)
CALCIUM SERPL-MCNC: 10.4 MG/DL (ref 8.5–10.1)
CHLORIDE SERPL-SCNC: 110 MEQ/L (ref 98–107)
CO2 SERPL-SCNC: 29 MEQ/L (ref 21–32)
CREAT SERPL-MCNC: 1.1 MG/DL (ref 0.7–1.3)
GFR SERPL CREATININE-BSD FRML MDRD: > 60 ML/MIN/{1.73_M2} (ref 56–?)
GLUCOSE SERPL-MCNC: 94 MG/DL (ref 70–100)
MAGNESIUM SERPL-MCNC: 2.1 MG/DL (ref 1.8–2.4)
PHOSPHATE SERPL-MCNC: 2.9 MG/DL (ref 2.5–4.9)
POTASSIUM SERPL-SCNC: 4.4 MEQ/L (ref 3.5–5.1)
PTH-INTACT SERPL-MCNC: 103.5 PG/ML (ref 18.5–88)
SODIUM SERPL-SCNC: 141 MEQ/L (ref 136–145)

## 2021-06-24 PROCEDURE — 84100 ASSAY OF PHOSPHORUS: CPT

## 2021-06-24 PROCEDURE — 87536 HIV-1 QUANT&REVRSE TRNSCRPJ: CPT

## 2021-06-24 PROCEDURE — 83735 ASSAY OF MAGNESIUM: CPT

## 2021-06-24 PROCEDURE — 83970 ASSAY OF PARATHORMONE: CPT

## 2021-06-24 PROCEDURE — 80048 BASIC METABOLIC PNL TOTAL CA: CPT

## 2021-06-24 PROCEDURE — 36415 COLL VENOUS BLD VENIPUNCTURE: CPT

## 2021-06-24 PROCEDURE — 82330 ASSAY OF CALCIUM: CPT

## 2021-06-28 LAB
HIV1 RNA # SERPL NAA+PROBE: 40 COPIES/ML
HIV1 RNA SERPL NAA+PROBE-LOG#: 1.6 {LOG_COPIES}/ML

## 2021-12-13 ENCOUNTER — HOSPITAL ENCOUNTER (OUTPATIENT)
Dept: HOSPITAL 53 - M PLALAB | Age: 56
End: 2021-12-13
Attending: INTERNAL MEDICINE
Payer: MEDICARE

## 2021-12-13 DIAGNOSIS — Z23: ICD-10-CM

## 2021-12-13 DIAGNOSIS — E78.2: ICD-10-CM

## 2021-12-13 DIAGNOSIS — M81.0: ICD-10-CM

## 2021-12-13 DIAGNOSIS — B20: Primary | ICD-10-CM

## 2021-12-13 LAB
25(OH)D3 SERPL-MCNC: 35.9 NG/ML (ref 30–100)
ALBUMIN SERPL BCG-MCNC: 3.7 GM/DL (ref 3.2–5.2)
ALT SERPL W P-5'-P-CCNC: 20 U/L (ref 12–78)
APPEARANCE UR: CLEAR
BACTERIA UR QL AUTO: NEGATIVE
BILIRUB SERPL-MCNC: 0.3 MG/DL (ref 0.2–1)
BILIRUB UR QL STRIP.AUTO: NEGATIVE
BUN SERPL-MCNC: 8 MG/DL (ref 7–18)
CALCIUM SERPL-MCNC: 10.8 MG/DL (ref 8.5–10.1)
CHLORIDE SERPL-SCNC: 109 MEQ/L (ref 98–107)
CHOLEST SERPL-MCNC: 193 MG/DL (ref ?–200)
CHOLEST/HDLC SERPL: 7.15 {RATIO} (ref ?–5)
CO2 SERPL-SCNC: 26 MEQ/L (ref 21–32)
CREAT SERPL-MCNC: 0.99 MG/DL (ref 0.7–1.3)
GFR SERPL CREATININE-BSD FRML MDRD: > 60 ML/MIN/{1.73_M2} (ref 56–?)
GLUCOSE SERPL-MCNC: 101 MG/DL (ref 70–100)
GLUCOSE UR QL STRIP.AUTO: NEGATIVE MG/DL
HDLC SERPL-MCNC: 27 MG/DL (ref 40–?)
HGB UR QL STRIP.AUTO: NEGATIVE
KETONES UR QL STRIP.AUTO: NEGATIVE MG/DL
LDLC SERPL CALC-MCNC: 134 MG/DL (ref ?–100)
LEUKOCYTE ESTERASE UR QL STRIP.AUTO: NEGATIVE
N GONORRHOEA RRNA SPEC QL NAA+PROBE: NEGATIVE
NITRITE UR QL STRIP.AUTO: NEGATIVE
NONHDLC SERPL-MCNC: 166 MG/DL
PH UR STRIP.AUTO: 6 UNITS (ref 5–9)
POTASSIUM SERPL-SCNC: 4.2 MEQ/L (ref 3.5–5.1)
PROT SERPL-MCNC: 7 GM/DL (ref 6.4–8.2)
PROT UR QL STRIP.AUTO: NEGATIVE MG/DL
RBC # UR AUTO: 0 /HPF (ref 0–3)
SODIUM SERPL-SCNC: 140 MEQ/L (ref 136–145)
SP GR UR STRIP.AUTO: 1.01 (ref 1–1.03)
SQUAMOUS #/AREA URNS AUTO: 0 /HPF (ref 0–6)
TRIGL SERPL-MCNC: 160 MG/DL (ref ?–150)
UROBILINOGEN UR QL STRIP.AUTO: 0.2 MG/DL (ref 0–2)
WBC #/AREA URNS AUTO: 2 /HPF (ref 0–3)

## 2021-12-13 PROCEDURE — 86360 T CELL ABSOLUTE COUNT/RATIO: CPT

## 2021-12-13 PROCEDURE — 81001 URINALYSIS AUTO W/SCOPE: CPT

## 2021-12-13 PROCEDURE — 36415 COLL VENOUS BLD VENIPUNCTURE: CPT

## 2021-12-13 PROCEDURE — 80053 COMPREHEN METABOLIC PANEL: CPT

## 2021-12-13 PROCEDURE — 80061 LIPID PANEL: CPT

## 2021-12-13 PROCEDURE — 82306 VITAMIN D 25 HYDROXY: CPT

## 2021-12-13 PROCEDURE — 90471 IMMUNIZATION ADMIN: CPT

## 2021-12-13 PROCEDURE — 87536 HIV-1 QUANT&REVRSE TRNSCRPJ: CPT

## 2021-12-13 PROCEDURE — 87491 CHLMYD TRACH DNA AMP PROBE: CPT

## 2021-12-13 PROCEDURE — 87591 N.GONORRHOEAE DNA AMP PROB: CPT

## 2021-12-13 PROCEDURE — 86780 TREPONEMA PALLIDUM: CPT

## 2021-12-13 PROCEDURE — 90682 RIV4 VACC RECOMBINANT DNA IM: CPT

## 2021-12-15 LAB
BASOPHILS # BLD: 0.1 X10E3/UL (ref 0–0.2)
CD3+CD4+ CELLS # BLD: 1739 /UL (ref 359–1519)
CD3+CD4+ CELLS NFR BLD: 56.1 % (ref 30.8–58.5)
CD3+CD4+ CELLS/CD3+CD8+ CLL BLD: 2.27 % (ref 0.92–3.72)
CD3+CD8+ CELLS # BLD: 766 /UL (ref 109–897)
CD3+CD8+ CELLS NFR BLD: 24.7 % (ref 12–35.5)
EOSINOPHIL # BLD: 0.2 X10E3/UL (ref 0–0.4)
ERYTHROCYTE [DISTWIDTH] IN BLOOD BY AUTOMATED COUNT: 13.6 % (ref 11.6–15.4)
HCT VFR BLD AUTO: 43.9 % (ref 37.5–51)
HGB BLD-MCNC: 15 G/DL (ref 13–17.7)
HIV1 RNA # SERPL NAA+PROBE: <20 COPIES/ML
HIV1 RNA SERPL NAA+PROBE-LOG#: (no result) {LOG_COPIES}/ML
LYMPHOCYTES # BLD AUTO: 3.1 X10E3/UL (ref 0.7–3.1)
MCH RBC QN AUTO: 31.1 PG (ref 26.6–33)
MCHC RBC AUTO-ENTMCNC: 34.2 G/DL (ref 31.5–35.7)
MCV RBC: 91 FL (ref 79–97)
MONOCYTES # BLD: 0.7 X10E3/UL (ref 0.1–0.9)
NEUTROPHILS # BLD AUTO: 5.7 X10E3/UL (ref 1.4–7)
NRBC BLD AUTO-RTO: (no result) %
RBC # BLD AUTO: 4.83 X10E6/UL (ref 4.14–5.8)
WBC # BLD AUTO: 9.9 X10E3/UL (ref 3.4–10.8)

## 2022-08-05 ENCOUNTER — HOSPITAL ENCOUNTER (OUTPATIENT)
Dept: HOSPITAL 53 - M PAIN | Age: 57
End: 2022-08-05
Attending: FAMILY MEDICINE
Payer: MEDICARE

## 2022-08-05 DIAGNOSIS — M51.36: ICD-10-CM

## 2022-08-05 DIAGNOSIS — Z88.8: ICD-10-CM

## 2022-08-05 DIAGNOSIS — M50.10: ICD-10-CM

## 2022-08-05 DIAGNOSIS — F43.10: ICD-10-CM

## 2022-08-05 DIAGNOSIS — I10: ICD-10-CM

## 2022-08-05 DIAGNOSIS — B20: ICD-10-CM

## 2022-08-05 DIAGNOSIS — G25.81: ICD-10-CM

## 2022-08-05 DIAGNOSIS — M79.18: Primary | ICD-10-CM

## 2022-08-05 DIAGNOSIS — M79.7: ICD-10-CM

## 2022-08-05 DIAGNOSIS — G89.29: ICD-10-CM

## 2022-08-05 DIAGNOSIS — Z85.828: ICD-10-CM

## 2022-08-05 DIAGNOSIS — L30.9: ICD-10-CM

## 2022-08-05 DIAGNOSIS — M81.0: ICD-10-CM

## 2022-08-05 DIAGNOSIS — L40.9: ICD-10-CM

## 2022-08-05 DIAGNOSIS — G47.33: ICD-10-CM

## 2022-08-05 DIAGNOSIS — E78.00: ICD-10-CM

## 2022-08-05 DIAGNOSIS — Z79.899: ICD-10-CM

## 2022-08-05 DIAGNOSIS — Z87.891: ICD-10-CM

## 2022-08-05 DIAGNOSIS — K21.9: ICD-10-CM

## 2022-08-05 DIAGNOSIS — J42: ICD-10-CM

## 2022-08-05 DIAGNOSIS — F41.1: ICD-10-CM

## 2022-08-05 DIAGNOSIS — F33.1: ICD-10-CM
